# Patient Record
Sex: FEMALE | Race: WHITE
[De-identification: names, ages, dates, MRNs, and addresses within clinical notes are randomized per-mention and may not be internally consistent; named-entity substitution may affect disease eponyms.]

---

## 2021-12-19 ENCOUNTER — HOSPITAL ENCOUNTER (EMERGENCY)
Dept: HOSPITAL 54 - ER | Age: 36
Discharge: HOME | End: 2021-12-19
Payer: COMMERCIAL

## 2021-12-19 VITALS — HEIGHT: 64 IN | BODY MASS INDEX: 18.78 KG/M2 | WEIGHT: 110 LBS

## 2021-12-19 VITALS — DIASTOLIC BLOOD PRESSURE: 59 MMHG | SYSTOLIC BLOOD PRESSURE: 103 MMHG

## 2021-12-19 DIAGNOSIS — Z84.89: ICD-10-CM

## 2021-12-19 DIAGNOSIS — R07.0: Primary | ICD-10-CM

## 2021-12-19 DIAGNOSIS — M79.18: ICD-10-CM

## 2021-12-19 DIAGNOSIS — Z20.822: ICD-10-CM

## 2021-12-19 DIAGNOSIS — F17.200: ICD-10-CM

## 2021-12-19 DIAGNOSIS — I45.6: ICD-10-CM

## 2021-12-19 LAB
BASOPHILS # BLD AUTO: 0 K/UL (ref 0–0.2)
BASOPHILS NFR BLD AUTO: 0.5 % (ref 0–2)
BUN SERPL-MCNC: 13 MG/DL (ref 7–18)
CALCIUM SERPL-MCNC: 9.1 MG/DL (ref 8.5–10.1)
CHLORIDE SERPL-SCNC: 104 MMOL/L (ref 98–107)
CO2 SERPL-SCNC: 29 MMOL/L (ref 21–32)
CREAT SERPL-MCNC: 1 MG/DL (ref 0.6–1.3)
EOSINOPHIL NFR BLD AUTO: 1.2 % (ref 0–6)
GLUCOSE SERPL-MCNC: 88 MG/DL (ref 74–106)
HCT VFR BLD AUTO: 40 % (ref 33–45)
HGB BLD-MCNC: 13.3 G/DL (ref 11.5–14.8)
LYMPHOCYTES NFR BLD AUTO: 1.7 K/UL (ref 0.8–4.8)
LYMPHOCYTES NFR BLD AUTO: 30.4 % (ref 20–44)
MCHC RBC AUTO-ENTMCNC: 33 G/DL (ref 31–36)
MCV RBC AUTO: 93 FL (ref 82–100)
MONOCYTES NFR BLD AUTO: 0.4 K/UL (ref 0.1–1.3)
MONOCYTES NFR BLD AUTO: 6.3 % (ref 2–12)
NEUTROPHILS # BLD AUTO: 3.5 K/UL (ref 1.8–8.9)
NEUTROPHILS NFR BLD AUTO: 61.6 % (ref 43–81)
PLATELET # BLD AUTO: 283 K/UL (ref 150–450)
POTASSIUM SERPL-SCNC: 3.5 MMOL/L (ref 3.5–5.1)
RBC # BLD AUTO: 4.27 MIL/UL (ref 4–5.2)
SODIUM SERPL-SCNC: 139 MMOL/L (ref 136–145)
WBC NRBC COR # BLD AUTO: 5.6 K/UL (ref 4.3–11)

## 2021-12-19 PROCEDURE — 70498 CT ANGIOGRAPHY NECK: CPT

## 2021-12-19 PROCEDURE — 99285 EMERGENCY DEPT VISIT HI MDM: CPT

## 2021-12-19 PROCEDURE — 85025 COMPLETE CBC W/AUTO DIFF WBC: CPT

## 2021-12-19 PROCEDURE — 71275 CT ANGIOGRAPHY CHEST: CPT

## 2021-12-19 PROCEDURE — C9803 HOPD COVID-19 SPEC COLLECT: HCPCS

## 2021-12-19 PROCEDURE — 80048 BASIC METABOLIC PNL TOTAL CA: CPT

## 2021-12-19 PROCEDURE — 84702 CHORIONIC GONADOTROPIN TEST: CPT

## 2021-12-19 PROCEDURE — 84703 CHORIONIC GONADOTROPIN ASSAY: CPT

## 2021-12-19 PROCEDURE — 87426 SARSCOV CORONAVIRUS AG IA: CPT

## 2021-12-19 PROCEDURE — 36415 COLL VENOUS BLD VENIPUNCTURE: CPT

## 2021-12-19 NOTE — NUR
PT BIB SELF C/O PAIN AND DIFFICULTY SWALLOWING  SINCE YESTERDAY,HAD 
CHIROPRACTOR NECK. ADJUSTMENT 12/13/2021,SPEAKING FULL SENTENCES WITHOUT 
DIFFICULTY. PT A/OX4. TOLERATING R/A WELL WITH NO SOB. CONNECTED PT TO POX AND 
MONITOR.

## 2021-12-19 NOTE — NUR
Patient discharged to home in stable condition. RX Written and verbal after 
care instructions given. Patient verbalizes understanding of instruction. PT 
ambulatory with a steady gait. DC IV WITH NO ACTIVE BLEEDING

## 2023-11-16 ENCOUNTER — OFFICE (OUTPATIENT)
Dept: URBAN - METROPOLITAN AREA CLINIC 67 | Facility: CLINIC | Age: 38
End: 2023-11-16

## 2023-11-16 VITALS — SYSTOLIC BLOOD PRESSURE: 101 MMHG | HEIGHT: 64 IN | WEIGHT: 111 LBS | DIASTOLIC BLOOD PRESSURE: 56 MMHG

## 2023-11-16 DIAGNOSIS — R10.13 EPIGASTRIC PAIN: ICD-10-CM

## 2023-11-16 DIAGNOSIS — K21.9 GERD: ICD-10-CM

## 2023-11-16 PROCEDURE — 99204 OFFICE O/P NEW MOD 45 MIN: CPT
